# Patient Record
Sex: FEMALE | Race: WHITE | NOT HISPANIC OR LATINO | URBAN - METROPOLITAN AREA
[De-identification: names, ages, dates, MRNs, and addresses within clinical notes are randomized per-mention and may not be internally consistent; named-entity substitution may affect disease eponyms.]

---

## 2017-03-06 ENCOUNTER — OFFICE VISIT - HEALTHEAST (OUTPATIENT)
Dept: FAMILY MEDICINE | Facility: CLINIC | Age: 57
End: 2017-03-06

## 2017-03-06 DIAGNOSIS — Z11.1 SCREENING FOR TUBERCULOSIS: ICD-10-CM

## 2017-03-06 DIAGNOSIS — B00.9 HERPES SIMPLEX TYPE 1 INFECTION: ICD-10-CM

## 2017-03-06 DIAGNOSIS — Z00.00 HEALTH CARE MAINTENANCE: ICD-10-CM

## 2017-03-06 DIAGNOSIS — R41.840 DIFFICULTY CONCENTRATING: ICD-10-CM

## 2017-03-06 DIAGNOSIS — M89.9 DISORDER OF BONE AND CARTILAGE: ICD-10-CM

## 2017-03-06 DIAGNOSIS — Z23 NEED FOR IMMUNIZATION AGAINST INFLUENZA: ICD-10-CM

## 2017-03-06 DIAGNOSIS — M94.9 DISORDER OF BONE AND CARTILAGE: ICD-10-CM

## 2017-03-06 LAB
CHOLEST SERPL-MCNC: 225 MG/DL
FASTING STATUS PATIENT QL REPORTED: YES
HDLC SERPL-MCNC: 68 MG/DL
LDLC SERPL CALC-MCNC: 146 MG/DL
TRIGL SERPL-MCNC: 56 MG/DL

## 2017-03-06 RX ORDER — ASPIRIN 81 MG/1
81 TABLET, CHEWABLE ORAL DAILY
Status: SHIPPED | COMMUNITY
Start: 2017-03-06

## 2017-03-06 ASSESSMENT — MIFFLIN-ST. JEOR: SCORE: 1075.51

## 2017-03-07 LAB — HCV AB SERPL QL IA: NEGATIVE

## 2017-03-08 ENCOUNTER — AMBULATORY - HEALTHEAST (OUTPATIENT)
Dept: NURSING | Facility: CLINIC | Age: 57
End: 2017-03-08

## 2017-03-08 DIAGNOSIS — Z11.1 SCREENING-PULMONARY TB: ICD-10-CM

## 2017-03-10 ENCOUNTER — COMMUNICATION - HEALTHEAST (OUTPATIENT)
Dept: FAMILY MEDICINE | Facility: CLINIC | Age: 57
End: 2017-03-10

## 2017-03-17 ENCOUNTER — HOSPITAL ENCOUNTER (OUTPATIENT)
Dept: MAMMOGRAPHY | Facility: CLINIC | Age: 57
Discharge: HOME | End: 2017-03-17

## 2017-03-17 DIAGNOSIS — Z00.00 HEALTH CARE MAINTENANCE: ICD-10-CM

## 2017-03-24 ENCOUNTER — AMBULATORY - HEALTHEAST (OUTPATIENT)
Dept: FAMILY MEDICINE | Facility: CLINIC | Age: 57
End: 2017-03-24

## 2017-03-24 ENCOUNTER — COMMUNICATION - HEALTHEAST (OUTPATIENT)
Dept: FAMILY MEDICINE | Facility: CLINIC | Age: 57
End: 2017-03-24

## 2017-03-24 DIAGNOSIS — Z12.11 SCREENING FOR COLON CANCER: ICD-10-CM

## 2017-03-31 ENCOUNTER — COMMUNICATION - HEALTHEAST (OUTPATIENT)
Dept: FAMILY MEDICINE | Facility: CLINIC | Age: 57
End: 2017-03-31

## 2017-04-03 ENCOUNTER — AMBULATORY - HEALTHEAST (OUTPATIENT)
Dept: FAMILY MEDICINE | Facility: CLINIC | Age: 57
End: 2017-04-03

## 2017-04-03 DIAGNOSIS — Z12.11 SCREENING FOR COLON CANCER: ICD-10-CM

## 2017-04-06 ENCOUNTER — RECORDS - HEALTHEAST (OUTPATIENT)
Dept: ADMINISTRATIVE | Facility: OTHER | Age: 57
End: 2017-04-06

## 2017-05-04 ENCOUNTER — RECORDS - HEALTHEAST (OUTPATIENT)
Dept: ADMINISTRATIVE | Facility: OTHER | Age: 57
End: 2017-05-04

## 2017-06-01 ENCOUNTER — RECORDS - HEALTHEAST (OUTPATIENT)
Dept: ADMINISTRATIVE | Facility: OTHER | Age: 57
End: 2017-06-01

## 2017-06-27 ENCOUNTER — COMMUNICATION - HEALTHEAST (OUTPATIENT)
Dept: FAMILY MEDICINE | Facility: CLINIC | Age: 57
End: 2017-06-27

## 2017-06-29 ENCOUNTER — OFFICE VISIT - HEALTHEAST (OUTPATIENT)
Dept: FAMILY MEDICINE | Facility: CLINIC | Age: 57
End: 2017-06-29

## 2017-06-29 DIAGNOSIS — F90.0 ADHD, PREDOMINANTLY INATTENTIVE TYPE: ICD-10-CM

## 2017-06-29 RX ORDER — DOXYCYCLINE 100 MG/1
CAPSULE ORAL
Refills: 2 | Status: SHIPPED | COMMUNITY
Start: 2017-06-14

## 2017-07-12 ENCOUNTER — COMMUNICATION - HEALTHEAST (OUTPATIENT)
Dept: FAMILY MEDICINE | Facility: CLINIC | Age: 57
End: 2017-07-12

## 2017-07-13 ENCOUNTER — AMBULATORY - HEALTHEAST (OUTPATIENT)
Dept: FAMILY MEDICINE | Facility: CLINIC | Age: 57
End: 2017-07-13

## 2017-08-17 ENCOUNTER — COMMUNICATION - HEALTHEAST (OUTPATIENT)
Dept: FAMILY MEDICINE | Facility: CLINIC | Age: 57
End: 2017-08-17

## 2017-08-17 DIAGNOSIS — F90.9 ADHD (ATTENTION DEFICIT HYPERACTIVITY DISORDER): ICD-10-CM

## 2017-10-05 ENCOUNTER — COMMUNICATION - HEALTHEAST (OUTPATIENT)
Dept: FAMILY MEDICINE | Facility: CLINIC | Age: 57
End: 2017-10-05

## 2017-10-05 DIAGNOSIS — F90.9 ADHD (ATTENTION DEFICIT HYPERACTIVITY DISORDER): ICD-10-CM

## 2017-11-19 ENCOUNTER — COMMUNICATION - HEALTHEAST (OUTPATIENT)
Dept: FAMILY MEDICINE | Facility: CLINIC | Age: 57
End: 2017-11-19

## 2017-11-20 ENCOUNTER — AMBULATORY - HEALTHEAST (OUTPATIENT)
Dept: FAMILY MEDICINE | Facility: CLINIC | Age: 57
End: 2017-11-20

## 2017-11-20 DIAGNOSIS — F90.9 ADHD (ATTENTION DEFICIT HYPERACTIVITY DISORDER): ICD-10-CM

## 2018-01-04 ENCOUNTER — COMMUNICATION - HEALTHEAST (OUTPATIENT)
Dept: FAMILY MEDICINE | Facility: CLINIC | Age: 58
End: 2018-01-04

## 2018-01-04 DIAGNOSIS — F90.9 ADHD (ATTENTION DEFICIT HYPERACTIVITY DISORDER): ICD-10-CM

## 2018-01-30 ENCOUNTER — OFFICE VISIT - HEALTHEAST (OUTPATIENT)
Dept: FAMILY MEDICINE | Facility: CLINIC | Age: 58
End: 2018-01-30

## 2018-01-30 DIAGNOSIS — F90.9 ADHD (ATTENTION DEFICIT HYPERACTIVITY DISORDER): ICD-10-CM

## 2018-01-30 ASSESSMENT — MIFFLIN-ST. JEOR: SCORE: 1063.26

## 2018-02-28 ENCOUNTER — AMBULATORY - HEALTHEAST (OUTPATIENT)
Dept: FAMILY MEDICINE | Facility: CLINIC | Age: 58
End: 2018-02-28

## 2018-02-28 ENCOUNTER — COMMUNICATION - HEALTHEAST (OUTPATIENT)
Dept: FAMILY MEDICINE | Facility: CLINIC | Age: 58
End: 2018-02-28

## 2018-03-02 ENCOUNTER — COMMUNICATION - HEALTHEAST (OUTPATIENT)
Dept: FAMILY MEDICINE | Facility: CLINIC | Age: 58
End: 2018-03-02

## 2018-03-29 ENCOUNTER — OFFICE VISIT - HEALTHEAST (OUTPATIENT)
Dept: FAMILY MEDICINE | Facility: CLINIC | Age: 58
End: 2018-03-29

## 2018-03-29 ENCOUNTER — COMMUNICATION - HEALTHEAST (OUTPATIENT)
Dept: FAMILY MEDICINE | Facility: CLINIC | Age: 58
End: 2018-03-29

## 2018-03-29 DIAGNOSIS — E53.8 VITAMIN B12 DEFICIENCY: ICD-10-CM

## 2018-03-29 DIAGNOSIS — Z86.19 HX OF COLD SORES: ICD-10-CM

## 2018-03-29 DIAGNOSIS — F90.9 ADHD: ICD-10-CM

## 2018-03-29 LAB
TSH SERPL DL<=0.005 MIU/L-ACNC: 0.61 UIU/ML (ref 0.3–5)
VIT B12 SERPL-MCNC: >2000 PG/ML (ref 213–816)

## 2018-03-29 RX ORDER — VALACYCLOVIR HYDROCHLORIDE 1 G/1
TABLET, FILM COATED ORAL
Qty: 20 TABLET | Refills: 0 | Status: SHIPPED | OUTPATIENT
Start: 2018-03-29

## 2018-05-01 ENCOUNTER — COMMUNICATION - HEALTHEAST (OUTPATIENT)
Dept: FAMILY MEDICINE | Facility: CLINIC | Age: 58
End: 2018-05-01

## 2018-05-01 DIAGNOSIS — F90.9 ADHD: ICD-10-CM

## 2018-05-30 ENCOUNTER — COMMUNICATION - HEALTHEAST (OUTPATIENT)
Dept: FAMILY MEDICINE | Facility: CLINIC | Age: 58
End: 2018-05-30

## 2018-05-30 DIAGNOSIS — F90.9 ADHD: ICD-10-CM

## 2018-06-30 ENCOUNTER — COMMUNICATION - HEALTHEAST (OUTPATIENT)
Dept: FAMILY MEDICINE | Facility: CLINIC | Age: 58
End: 2018-06-30

## 2018-06-30 DIAGNOSIS — F90.9 ADHD: ICD-10-CM

## 2018-07-31 ENCOUNTER — COMMUNICATION - HEALTHEAST (OUTPATIENT)
Dept: FAMILY MEDICINE | Facility: CLINIC | Age: 58
End: 2018-07-31

## 2018-07-31 DIAGNOSIS — F90.9 ADHD: ICD-10-CM

## 2018-08-30 ENCOUNTER — COMMUNICATION - HEALTHEAST (OUTPATIENT)
Dept: FAMILY MEDICINE | Facility: CLINIC | Age: 58
End: 2018-08-30

## 2018-08-30 DIAGNOSIS — F90.9 ADHD: ICD-10-CM

## 2018-09-02 ENCOUNTER — COMMUNICATION - HEALTHEAST (OUTPATIENT)
Dept: FAMILY MEDICINE | Facility: CLINIC | Age: 58
End: 2018-09-02

## 2018-09-02 DIAGNOSIS — F90.9 ADHD: ICD-10-CM

## 2018-10-08 ENCOUNTER — COMMUNICATION - HEALTHEAST (OUTPATIENT)
Dept: FAMILY MEDICINE | Facility: CLINIC | Age: 58
End: 2018-10-08

## 2018-10-08 DIAGNOSIS — F90.9 ADHD: ICD-10-CM

## 2018-11-05 ENCOUNTER — COMMUNICATION - HEALTHEAST (OUTPATIENT)
Dept: FAMILY MEDICINE | Facility: CLINIC | Age: 58
End: 2018-11-05

## 2018-11-05 DIAGNOSIS — F90.9 ADHD: ICD-10-CM

## 2018-12-05 ENCOUNTER — COMMUNICATION - HEALTHEAST (OUTPATIENT)
Dept: FAMILY MEDICINE | Facility: CLINIC | Age: 58
End: 2018-12-05

## 2018-12-05 DIAGNOSIS — F90.9 ADHD: ICD-10-CM

## 2018-12-10 ENCOUNTER — COMMUNICATION - HEALTHEAST (OUTPATIENT)
Dept: FAMILY MEDICINE | Facility: CLINIC | Age: 58
End: 2018-12-10

## 2018-12-10 DIAGNOSIS — F90.9 ADHD: ICD-10-CM

## 2018-12-11 ENCOUNTER — COMMUNICATION - HEALTHEAST (OUTPATIENT)
Dept: FAMILY MEDICINE | Facility: CLINIC | Age: 58
End: 2018-12-11

## 2018-12-11 DIAGNOSIS — F90.9 ADHD: ICD-10-CM

## 2018-12-11 RX ORDER — DEXTROAMPHETAMINE SACCHARATE, AMPHETAMINE ASPARTATE MONOHYDRATE, DEXTROAMPHETAMINE SULFATE AND AMPHETAMINE SULFATE 3.75; 3.75; 3.75; 3.75 MG/1; MG/1; MG/1; MG/1
15 CAPSULE, EXTENDED RELEASE ORAL DAILY
Qty: 30 CAPSULE | Refills: 0 | Status: SHIPPED | OUTPATIENT
Start: 2018-12-11

## 2019-04-06 ENCOUNTER — COMMUNICATION - HEALTHEAST (OUTPATIENT)
Dept: FAMILY MEDICINE | Facility: CLINIC | Age: 59
End: 2019-04-06

## 2019-04-06 DIAGNOSIS — Z86.19 HX OF COLD SORES: ICD-10-CM

## 2020-02-07 ENCOUNTER — RECORDS - HEALTHEAST (OUTPATIENT)
Dept: ADMINISTRATIVE | Facility: OTHER | Age: 60
End: 2020-02-07

## 2021-05-30 VITALS — BODY MASS INDEX: 21.26 KG/M2 | HEIGHT: 63 IN | WEIGHT: 120 LBS

## 2021-05-31 VITALS — HEIGHT: 62 IN | BODY MASS INDEX: 21.84 KG/M2 | WEIGHT: 118.7 LBS

## 2021-05-31 VITALS — BODY MASS INDEX: 21.6 KG/M2 | WEIGHT: 120 LBS

## 2021-06-01 VITALS — WEIGHT: 120.6 LBS | BODY MASS INDEX: 21.99 KG/M2

## 2021-06-09 NOTE — PROGRESS NOTES
"Rashida Brian is a 56 y.o. female who is here for a health maintenance visit.    She states she is having concerns with focus, she states she just can't focus and feels this may have been going on since childhood and in school.    Does have many projects going on at one time and difficult to complete. She states she \"can't learn anything anymore as she can't focus and concentrate\"   Running late and difficult for her to stay on time.    States sisters and son have history of ADHD.  She has never taken medication in the past for this concern.     She does plan to volunteer at [facility] and does needs records of immunizations, and does need PPD today     She is followed by Dermatology for rosacea  History of osteopenia.     Fasting for labs today    Reviewed and updated information below - medications, past medical, surgical, social, family history.    Allergies   Allergen Reactions     Latex Dermatitis     Current Outpatient Prescriptions   Medication Sig Dispense Refill     aspirin 81 mg chewable tablet Chew 81 mg daily.       CALCIUM/VITS D3/C/K2/MINERALS (BONE ESSENTIALS ORAL) Take by mouth.       metroNIDAZOLE (METROCREAM) 0.75 % cream        multivitamin with minerals (THERA-M) 9 mg iron-400 mcg Tab tablet Take 1 tablet by mouth daily.       doxycycline (ADOXA) 100 MG tablet        No current facility-administered medications for this visit.      Past Medical History:   Diagnosis Date     Bilateral bunions      H/O cold sores      Rosacea      Past Surgical History:   Procedure Laterality Date     BREAST BIOPSY Right      HYSTERECTOMY       [place] SUCT [place] LIPECTOMY,UP EXTREM      Description: Liposuction Arms;  Recorded: 10/31/2014;     [place] SUPRACERV ABD HYSTERECTOMY      Description: Supracervical Hysterectomy;  Recorded: 10/31/2014;     Social History     Social History     Marital status:      Spouse name: Antoine     Number of children: 1     Years of education: N/A     Occupational " "History      Declined     Social History Main Topics     Smoking status: Former Smoker     Quit date: 1/1/1982     Smokeless tobacco: Never Used     Alcohol use No     Drug use: No     Sexual activity: Yes     Partners: Male     Birth control/ protection: Surgical     Other Topics Concern     None     Social History Narrative     Family History   Problem Relation Age of Onset     Diabetes Mother      Hypertension Father          Review of Systems   Constitutional: Negative.   HENT: Negative.   Eyes: Negative.   Respiratory: Negative.   Cardiovascular: Negative.   Gastrointestinal: Negative.   Endocrine: Negative.   Genitourinary: Negative.   Musculoskeletal: Negative.   Skin: Negative.   Allergic/Immunologic: Negative.   Neurological: Negative.   Hematological: Negative.   Psychiatric/Behavioral: Negative.     Left bunion  Objective:     Physical Exam   Visit Vitals     /70 (Patient Site: Left Arm, Patient Position: Sitting)     Pulse 74     Temp 97.5  F (36.4  C) (Oral)     Ht 5' 2.5\" (1.588 m)     Wt 120 lb (54.4 kg)     BMI 21.6 kg/m2       Constitutional: Alert and oriented x3, well nourished without any acute distress  HENT:   Right Ear: External ear normal.   Left Ear: External ear normal.   Nose: Nose normal.   Mouth/Throat: Oropharynx is clear and moist.   Eyes: Conjunctivae and EOM are normal. Pupils are equal, round, and reactive to light. Right eye exhibits no discharge. Left eye exhibits no discharge.   Neck: No thyromegaly present.   Lymphadenopathy: Without palpable lymphadenopathy  Cardiovascular: Normal S1 and S2. Regular rate, rhythm and no murmur, rubs or gallops. Palpable distal pulses bilaterally.  Pulmonary/Chest: Normal effort. Lungs clear to auscultation in all lobes. Without wheezing, rhonchi or crackles.    Abdominal: Soft, non tenderness. There is no rebound or guarding. Bowel sounds x4. Without organomegaly. No CVA tenderness.  Musculoskeletal: 5/5 strength  And full ROM in all " "extremities. No joint swelling or deformity  Neurological: Cranial nerves intact, without deficit. Without numbness, tingling or paresthesia. Normal reflexes  Skin: Dry and intact; without rashes or lesions.   Psychiatric: Normal mood and affect.   Breast: No chest deformity, asymmetry. Normal contours. Without nodules, masses, tenderness, or axillary adenopathy. No nipple discharge or dimpling noted.       1. Health care maintenance  Ordered labs today and will follow up with results once received.   Mammogram ordered, dexa bone scan ordered.   Pap smear completed 2015 ; however will request records and patient is unsure if she has a cervix, I do believe it was removed with surgery \"transformational zone component absent\"    We discussed healthy lifestyle, nutrition, cardiovascular risk reduction, self care, safety, self breast exams, sunscreen, and seatbelt screening.  Recommended annual physical exam or sooner for any acute concerns.   - Vitamin D, Total (25-Hydroxy)  - HM2(CBC w/o Differential)  - Lipid [place] FASTING  - Comprehensive Metabolic Panel  - Mammo Screening Bilateral; Future  - Hepatitis C Antibody (Anti-HCV)      2. HSV-1 (herpes simplex virus 1) infection  Without symptoms today. Continue treatment as already prescribed.   Discussed with patient to follow up if worsening symptoms, questions or concerns  Patient agreed and appeared pleased with plan      3. Osteopenia  Dexa bone scan 2015,   She would not like to have it completed this year but next 2018  Continue with calcium and vitamin D intake, weight bearing exercises.       4. Rosacea  Continue with dermatology and already established regimen.       5. Screening for tuberculosis  Will order today for her upcoming volunteer experience at [facility]  I did explain to have PPD read in 48-72 hours.   Copy of immunizations given today.  - TB Skin Test      6. Difficulty concentrating  Possibly adult onset ADHD,   I did recommend a formal " psychiatry evaluation.   Referral placed today and Rashida did agree with plan.   - Ambulatory referral to Psychiatry

## 2021-06-11 NOTE — PROGRESS NOTES
Assessment/Plan:        Diagnoses and all orders for this visit:    ADHD, predominantly inattentive type  I did review records from psychiatry, formal diagnosis and evaluation.   She will continue with Ritalin 15mg in AM and 10mg in early afternoon.   She does not need a refill today but in 2 weeks, ok for refill.   CSA was completed today. I did reinforce side effects of medication, risks vs benefits.   She would like to continue as she feels she has tolerated this medication well.   Discussed side effects of medications and proper use. Patient verbalized understanding.  Discussed with patient to follow up if worsening symptoms, questions or concerns      She will need 60 tabs of 10mg and 30 tabs of 5mg when a refill is needed, CSA completed today, ok for refills.   Other orders  -     doxycycline (MONODOX) 100 MG capsule; TAKE ONE TABLET BY MOUTH ONCE DAILY; Refill: 2  -     valACYclovir (VALTREX) 1000 MG tablet; 1 TABLET DAILY BY MOUTH START THE DAY BEFORE PROCEDURE; Refill: 0  -     methylphenidate (RITALIN) 10 MG tablet; TAKE 1 TABLET TWICE DAILY.; Refill: 0  -     methylphenidate (RITALIN) 5 MG tablet; TAKE 1 TABLET TWICE DAILY @ 0800 AND 1200; Refill: 0          Subjective:    Patient ID: Rashida Brian is a 56 y.o. female.    HPI Comments: 55 y/o female presents today for management of her ADHD -   She was formally evluated by Dr. Pham at [facility] and was started on Ritalin.   She is taking 15 mg in AM and 10mg in early afternoon. She states she started this medication, 3 months ago.   She presents today for me, PCP, to manage her medication.   He feels like it is helping her, she is able to concentrate better and feels this is a problem that has stemmed back to her childhood.   She was diagnosed with predominantly inattentive type.   Denies any decreased appetitie, weight loss or any side effects of medication. Denies palpitations, chest pain.   She did just increase to 15mg BID per her  "psychiatrist but did feel too \"jittery\" with her second dose 15mg and decreased back  10mg in PM  She has two weeks left and does not need a refill today.  Stable medication.   Denies any concerns with mood today, she overall feels great and much better.       The following portions of the patient's history were reviewed and updated as appropriate: allergies, current medications, past family history, past medical history, past social history, past surgical history and problem list.    Review of Systems   Respiratory: Negative.    Cardiovascular: Negative.    Psychiatric/Behavioral: Positive for decreased concentration.        ADHD improved with Ritalin             Objective:    Physical Exam   Constitutional: She is oriented to person, place, and time. She appears well-developed and well-nourished. No distress.   Cardiovascular: Normal rate and normal heart sounds.    No murmur heard.  Pulmonary/Chest: Effort normal and breath sounds normal. No respiratory distress. She has no wheezes.   Neurological: She is alert and oriented to person, place, and time.   Skin: She is not diaphoretic.   Psychiatric: She has a normal mood and affect. Her behavior is normal. Judgment normal.           Vitals:    06/29/17 1602   BP: 130/80   Patient Site: Right Arm   Patient Position: Sitting   Cuff Size: Adult Regular   Weight: 120 lb (54.4 kg)       Current Outpatient Prescriptions   Medication Sig Dispense Refill     aspirin 81 mg chewable tablet Chew 81 mg daily.       doxycycline (ADOXA) 100 MG tablet        doxycycline (MONODOX) 100 MG capsule TAKE ONE TABLET BY MOUTH ONCE DAILY  2     methylphenidate (RITALIN) 10 MG tablet TAKE 1 TABLET TWICE DAILY.  0     methylphenidate (RITALIN) 5 MG tablet TAKE 1 TABLET TWICE DAILY @ 0800 AND 1200  0     metroNIDAZOLE (METROCREAM) 0.75 % cream        multivitamin with minerals (THERA-M) 9 mg iron-400 mcg Tab tablet Take 1 tablet by mouth daily.       valACYclovir (VALTREX) 1000 MG tablet 1 " TABLET DAILY BY MOUTH START THE DAY BEFORE PROCEDURE  0     No current facility-administered medications for this visit.

## 2021-06-15 PROBLEM — B00.9 HERPES SIMPLEX TYPE 1 INFECTION: Status: ACTIVE | Noted: 2017-03-06

## 2021-06-15 NOTE — PROGRESS NOTES
Subjective:      Rashida Brian is a 57 y.o. female who presents today for follow up on ADHD/medication check.    She was formally evaluated by Dr. Pham at [facility] and found to have ADHD on 06/2017.  She was diagnosed with predominantly inattentive type.  At that time she was started on Ritalin and has followed up per recommendations of Dr. Pham.  She comes today for continued management of her medications.  She currently is taking 10-15 mg in the AM and 10 mg in the afternoon.  She states she takes 10 mg mostly for both doses.  She feels like the ritalin is okay with helping her but could be better managed.  She endorses she is able to concentrate better and feels this is a problem that has stemmed back to her childhood.  Denies any decreased appetitie, weight loss, palpitations, and chest pain.  She does endorse having irritability after taking her dose of ritalin which is short-lived (30-40 minutes)  Today she endorses that her concentration and mood could be better.  Open to other options as far as medication today.     Reviewed past medical/surgical history, family history, social history, medications and updated chart below.       Allergies   Allergen Reactions     Latex Dermatitis     Past Medical History:   Diagnosis Date     Bilateral bunions      H/O cold sores      Rosacea      Past Surgical History:   Procedure Laterality Date     BREAST BIOPSY Right 2010     HYSTERECTOMY       [place] SUCT [place] LIPECTOMY,UP EXTREM      Description: Liposuction Arms;  Recorded: 10/31/2014;     [place] SUPRACERV ABD HYSTERECTOMY      Description: Supracervical Hysterectomy;  Recorded: 10/31/2014;     Social History     Social History     Marital status:      Spouse name: Antoine     Number of children: 1     Years of education: N/A     Occupational History      Declined     Social History Main Topics     Smoking status: Former Smoker     Quit date: 1/1/1982     Smokeless tobacco: Never Used     Alcohol use  "No     Drug use: No     Sexual activity: Yes     Partners: Male     Birth control/ protection: Surgical     Other Topics Concern     Not on file     Social History Narrative     Family History   Problem Relation Age of Onset     Diabetes Mother      Hypertension Father      Current Outpatient Prescriptions   Medication Sig Dispense Refill     aspirin 81 mg chewable tablet Chew 81 mg daily.       metroNIDAZOLE (METROCREAM) 0.75 % cream        multivitamin with minerals (THERA-M) 9 mg iron-400 mcg Tab tablet Take 1 tablet by mouth daily.       valACYclovir (VALTREX) 1000 MG tablet 1 TABLET DAILY BY MOUTH START THE DAY BEFORE PROCEDURE  0     dextroamphetamine-amphetamine (ADDERALL XR) 10 MG 24 hr capsule Take 1 capsule (10 mg total) by mouth daily. 30 capsule 0     doxycycline (ADOXA) 100 MG tablet        doxycycline (MONODOX) 100 MG capsule TAKE ONE TABLET BY MOUTH ONCE DAILY  2     No current facility-administered medications for this visit.      Patient Active Problem List    Diagnosis Date Noted     Herpes simplex type 1 infection 03/06/2017     Hallux abducto valgus, unspecified laterality 01/04/2016     Osteopenia        Review of Systems   Constitutional: Negative.   HENT: Hx of cold sores.   Eyes: Hx of ocular rosacea.   Respiratory: Negative.   Cardiovascular: Negative.   Gastrointestinal: Negative.   Endocrine: Negative.   Genitourinary: Negative.   Musculoskeletal: Negative.   Skin: Hx of rosacea.   Allergic/Immunologic: Negative.   Neurological: Negative.   Hematological: Negative.   Psychiatric/Behavioral: Hx of ADHD.     Objective:    Physical Exam   /78 (Patient Site: Left Arm, Patient Position: Sitting, Cuff Size: Adult Regular)  Pulse 70  Ht 5' 2.1\" (1.577 m)  Wt 118 lb 11.2 oz (53.8 kg)  SpO2 99%  BMI 21.64 kg/m2    Constitutional: Alert and oriented x3, well nourished without any acute distress  Cardiovascular: Normal S1 and S2. Regular rate, rhythm and no murmur, rubs or gallops. " Palpable distal pulses bilaterally.  Pulmonary/Chest: Normal effort. Lungs clear to auscultation in all lobes. Without wheezing, rhonchi or crackles.    Abdominal: Soft, non tenderness. There is no rebound or guarding. Bowel sounds x4. Without organomegaly. No CVA tenderness.  Psychiatric: Normal mood and affect.         Assessment/Plan:    1. ADHD (attention deficit hyperactivity disorder)  History of ADHD.  Will discontinue Ritalin dosing due to side effect of irritability after medication administration.  Patient also feels she can be better managed with her concentration and mood.  Will switch her to adderall XR 10 mg daily with short follow up in 2-4 weeks.  Reinforced risks vs benefit of medications and side effects along with proper use.   Denies chest pain, racing/skipped heart beat, shortness of breath, weight loss, and decreased appetite.  Discussed red flags that would warrant urgent evaluation. Patient verbalized understanding.  Discussed with patient to follow up if worsening symptoms, questions, or concerns.    I will complete CSA once stable dose.     - dextroamphetamine-amphetamine (ADDERALL XR) 10 MG 24 hr capsule; Take 1 capsule (10 mg total) by mouth daily.  Dispense: 30 capsule; Refill: 0    Office visit and charting completed with Student NP Betty Ruiz.   Tigist Gutiérrez CNP  1/30/2018

## 2021-06-17 NOTE — PROGRESS NOTES
Assessment/Plan:      1. Hx of cold sores  History of cold sores and takes Valtrex as start of outbreak.  She is in need of refill today, Valtrex refilled at today's visit.  Denies any active outbreaks at this time.  Discussed side effects of medications and proper use.  Discussed red flags that would warrant urgent evaluation. Patient verbalized understanding.  Discussed with patient to follow up if worsening symptoms, questions, or concerns.    - valACYclovir (VALTREX) 1000 MG tablet; 1000 mg by mouth (2 tablets, total of 2000 mg) every 12 hours for 1 day  Dispense: 20 tablet; Refill: 0  - JIC LAV    2. Vitamin B12 deficiency, family hx of  Family history of B12 deficiency and she would like her levels checked.  Endorses having eye twitching and will check Vitamin B12 along with thyroid level today.   B12 level ordered and will follow up with results once received.  Discussed with patient to follow up if worsening symptoms, questions, or concerns.    - Vitamin B12  - Thyroid Cascade    3. ADHD  CSA filled out and reviewed today  History of ADHD with formal evaluation.  Currently stable on Adderall XR 15 mg daily.  CSA reviewed and signed with patient today.  She is in need of refill, refilled.  Discussed side effects of medications and proper use. Patient verbalized understanding.  Discussed with patient to follow up if worsening symptoms, questions, or concerns.    - dextroamphetamine-amphetamine (ADDERALL XR) 15 MG 24 hr capsule; Take 1 capsule (15 mg total) by mouth daily.  Dispense: 30 capsule; Refill: 0      Office visit and charting completed with DNP student, Betty Ruiz  Office visit was greater than 25minutes and over 50% was spent counseling Rashida Gutiérrez, [facility]    Subjective:    Patient ID: Rashida Brian is a 57 y.o. female.    HPI Comments: 55 y/o female presents today for management of her ADHD -   She was formally evluated by Dr. Pham at [facility]  and was initially started on Ritalin.   After about 3 months she felt she was not well controlled and was having irritable moods with taking Ritalin.  On last visit I discontinued the Ritalin and Adderall was started at 10 mg.  She was increased to 15 mg daily and presents today for medication follow up.   She feels she is well controlled on this dose. Denies any side effects and would like to continue with medication.   Denies insomnia, weight loss, racing heart beat, or palpitations.    Her other concern at today's visit is family history of B12 deficiency.  She has noted eye twitching states her sister and mother have hx of vitamin B12 deficiency.   She would like her b12 levels checked at today's visit.    History of cold sores.  Currently takes Valtrex as needed for outbreaks.  She is in need of refill today.    The following portions of the patient's history were reviewed and updated as appropriate: allergies, current medications, past family history, past medical history, past social history, past surgical history and problem list.    Review of Systems   Respiratory: Negative.    Cardiovascular: Negative.    Psychiatric/Behavioral: ADHD improved with Adderall            Objective:    Physical Exam   Constitutional: She is oriented to person, place, and time. She appears well-developed and well-nourished. No distress.   Cardiovascular: Normal rate and normal heart sounds.    No murmur heard.  Pulmonary/Chest: Effort normal and breath sounds normal. No respiratory distress. She has no wheezes.   Neurological: She is alert and oriented to person, place, and time.   Skin: She is not diaphoretic.   Psychiatric: She has a normal mood and affect. Her behavior is normal. Judgment normal.           Vitals:    03/29/18 1047   BP: 104/82   Patient Site: Left Arm   Patient Position: Sitting   Cuff Size: Adult Regular   Pulse: 68   Weight: 120 lb 9.6 oz (54.7 kg)       Current Outpatient Prescriptions   Medication Sig  Dispense Refill     aspirin 81 mg chewable tablet Chew 81 mg daily.       cephalexin (KEFLEX) 500 MG capsule TAKE 1 CAPSULE BY MOUTH TWICE DAILY FOR 7 DAYS. STARTING DAY OF PROCEDURE  0     dextroamphetamine-amphetamine (ADDERALL XR) 15 MG 24 hr capsule Take 1 capsule (15 mg total) by mouth daily. 30 capsule 0     doxycycline (MONODOX) 100 MG capsule TAKE ONE TABLET BY MOUTH ONCE DAILY  2     metroNIDAZOLE (METROCREAM) 0.75 % cream        multivitamin with minerals (THERA-M) 9 mg iron-400 mcg Tab tablet Take 1 tablet by mouth daily.       valACYclovir (VALTREX) 1000 MG tablet 1000 mg by mouth (2 tablets, total of 2000 mg) every 12 hours for 1 day 20 tablet 0     doxycycline (ADOXA) 100 MG tablet        No current facility-administered medications for this visit.

## 2021-08-15 ENCOUNTER — HEALTH MAINTENANCE LETTER (OUTPATIENT)
Age: 61
End: 2021-08-15

## 2021-10-11 ENCOUNTER — HEALTH MAINTENANCE LETTER (OUTPATIENT)
Age: 61
End: 2021-10-11

## 2022-09-25 ENCOUNTER — HEALTH MAINTENANCE LETTER (OUTPATIENT)
Age: 62
End: 2022-09-25

## 2023-10-14 ENCOUNTER — HEALTH MAINTENANCE LETTER (OUTPATIENT)
Age: 63
End: 2023-10-14